# Patient Record
Sex: MALE | Race: WHITE | Employment: PART TIME | URBAN - METROPOLITAN AREA
[De-identification: names, ages, dates, MRNs, and addresses within clinical notes are randomized per-mention and may not be internally consistent; named-entity substitution may affect disease eponyms.]

---
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
## 2024-08-26 ENCOUNTER — HOSPITAL ENCOUNTER (EMERGENCY)
Age: 19
Discharge: HOME | End: 2024-08-26

## 2024-08-26 ENCOUNTER — APPOINTMENT (OUTPATIENT)
Dept: GENERAL RADIOLOGY | Age: 19
End: 2024-08-26

## 2024-08-26 VITALS
HEART RATE: 77 BPM | OXYGEN SATURATION: 98 % | RESPIRATION RATE: 16 BRPM | DIASTOLIC BLOOD PRESSURE: 75 MMHG | SYSTOLIC BLOOD PRESSURE: 129 MMHG | TEMPERATURE: 97.8 F

## 2024-08-26 DIAGNOSIS — S49.92XA LEFT UPPER ARM INJURY, INITIAL ENCOUNTER: ICD-10-CM

## 2024-08-26 DIAGNOSIS — T14.8XXA BRUISING: Primary | ICD-10-CM

## 2024-08-26 PROCEDURE — 6370000000 HC RX 637 (ALT 250 FOR IP): Performed by: STUDENT IN AN ORGANIZED HEALTH CARE EDUCATION/TRAINING PROGRAM

## 2024-08-26 PROCEDURE — 99283 EMERGENCY DEPT VISIT LOW MDM: CPT

## 2024-08-26 PROCEDURE — 73080 X-RAY EXAM OF ELBOW: CPT

## 2024-08-26 PROCEDURE — 73130 X-RAY EXAM OF HAND: CPT

## 2024-08-26 PROCEDURE — 73090 X-RAY EXAM OF FOREARM: CPT

## 2024-08-26 RX ORDER — MELOXICAM 15 MG/1
15 TABLET ORAL DAILY
Qty: 30 TABLET | Refills: 0 | Status: SHIPPED | OUTPATIENT
Start: 2024-08-26 | End: 2024-09-25

## 2024-08-26 RX ORDER — MELOXICAM 7.5 MG/1
15 TABLET ORAL ONCE
Status: COMPLETED | OUTPATIENT
Start: 2024-08-26 | End: 2024-08-26

## 2024-08-26 RX ADMIN — MELOXICAM 15 MG: 7.5 TABLET ORAL at 22:28

## 2024-08-26 ASSESSMENT — ENCOUNTER SYMPTOMS
COUGH: 0
VOMITING: 0
ABDOMINAL PAIN: 0
SHORTNESS OF BREATH: 0
TROUBLE SWALLOWING: 0
FACIAL SWELLING: 0
PHOTOPHOBIA: 0

## 2024-08-26 ASSESSMENT — PAIN DESCRIPTION - LOCATION
LOCATION: ARM
LOCATION: ARM

## 2024-08-26 ASSESSMENT — PAIN - FUNCTIONAL ASSESSMENT
PAIN_FUNCTIONAL_ASSESSMENT: 0-10
PAIN_FUNCTIONAL_ASSESSMENT: 0-10

## 2024-08-26 ASSESSMENT — PAIN SCALES - GENERAL
PAINLEVEL_OUTOF10: 7
PAINLEVEL_OUTOF10: 7
PAINLEVEL_OUTOF10: 10

## 2024-08-26 ASSESSMENT — PAIN DESCRIPTION - ORIENTATION
ORIENTATION: LEFT
ORIENTATION: LEFT

## 2024-08-27 NOTE — ED TRIAGE NOTES
Pt report at work this am working on mower and ran into tree limb striking left arm/elbow. Pt had pain. Pt reports  latter in day working next to ladder that fell onto left forearm pt with pain and small laceration to mid/distal forearm. Injury happened approx 330pm. Bleeding controlled. Movement/sensation intact.

## 2024-08-27 NOTE — ED PROVIDER NOTES
Emergency Department Provider Note       PCP: Montse Eng MD   Age: 19 y.o.   Sex: male     DISPOSITION Discharge - Pending Orders Complete 08/26/2024 09:49:48 PM  Condition at Disposition: Good       ICD-10-CM    1. Bruising  T14.8XXA       2. Left upper arm injury, initial encounter  S49.92XA           Medical Decision Making     In summary this is a 19-year-old male patient presenting for evaluation of a left forearm injury.  Do suspect contusion. Based on my evaluation I feel the patient is at low risk for alternative causes such as compartment syndrome, distal neurovascular injury, open fracture. The reasoning behind my decision process is that the patient is overall well-appearing with no acute distress noted. There is no presence of an open wound that would be indicative of open fracture. Compartments are soft and nontender without evidence of compartment syndrome. Distal vasculature and sensation is intact with brisk capillary refill. My independent interpretation of initial x-ray evaluation is grossly unremarkable.  Will give pain control. I have specifically counseled the patient on warning signs to return immediately for including but not limited to signs of compartment syndrome, infection. The patient has verbalized understanding and is in agreement with the treatment plan.        1 acute, uncomplicated illness or injury.  Over the counter drug management performed.  Prescription drug management performed.  Patient was discharged risks and benefits of hospitalization were considered.  Shared medical decision making was utilized in creating the patients health plan today.  ED attending physician present in department at time of care. Based on current hospital policy, their co-signature is not required on this note.   I independently ordered and reviewed each unique test.       I interpreted the X-rays no fracture.  RADIOLOGY DISCLAIMER: Although I do my best to interpret the imaging, I am not a

## 2024-08-27 NOTE — DISCHARGE INSTRUCTIONS
Your x-rays were normal today did not show signs of fracture.  I do suspect your pain is secondary to bruising and local inflammation.  Use daily anti-inflammatory to help control your pain  Return here for new or worsening symptoms.    As we discussed, I did not find a life threatening cause of your symptoms today. However, THAT DOES NOT MEAN IT COULD NOT DEVELOP. If you develop ANY new or worsening symptoms, it is critical that you return for re-evaluation. This includes any symptoms that are concerning to you, especially symptoms such as fevers, severe pain, weakness.  If you do not return for re-evaluation, you risk serious complications, including death.

## 2024-09-04 ENCOUNTER — TRANSCRIBE ORDERS (OUTPATIENT)
Facility: HOSPITAL | Age: 19
End: 2024-09-04

## 2024-09-04 DIAGNOSIS — M79.632 PAIN OF LEFT FOREARM: Primary | ICD-10-CM
